# Patient Record
Sex: MALE | Race: WHITE | NOT HISPANIC OR LATINO | Employment: FULL TIME | URBAN - METROPOLITAN AREA
[De-identification: names, ages, dates, MRNs, and addresses within clinical notes are randomized per-mention and may not be internally consistent; named-entity substitution may affect disease eponyms.]

---

## 2019-05-13 ENCOUNTER — HOSPITAL ENCOUNTER (OUTPATIENT)
Dept: WOUND CARE | Facility: HOSPITAL | Age: 39
Discharge: HOME | End: 2019-05-13
Attending: SURGERY | Admitting: SURGERY

## 2019-05-16 ENCOUNTER — CONVERSION ENCOUNTER (OUTPATIENT)
Dept: SURGERY | Facility: CLINIC | Age: 39
End: 2019-05-16

## 2019-05-28 ENCOUNTER — OFFICE (AMBULATORY)
Dept: URBAN - METROPOLITAN AREA CLINIC 64 | Facility: CLINIC | Age: 39
End: 2019-05-28

## 2019-05-28 VITALS
SYSTOLIC BLOOD PRESSURE: 133 MMHG | HEART RATE: 96 BPM | HEIGHT: 67 IN | DIASTOLIC BLOOD PRESSURE: 97 MMHG | WEIGHT: 203 LBS

## 2019-05-28 DIAGNOSIS — K62.5 HEMORRHAGE OF ANUS AND RECTUM: ICD-10-CM

## 2019-05-28 DIAGNOSIS — K60.3 ANAL FISTULA: ICD-10-CM

## 2019-05-28 PROCEDURE — 99243 OFF/OP CNSLTJ NEW/EST LOW 30: CPT | Performed by: NURSE PRACTITIONER

## 2019-06-04 VITALS
BODY MASS INDEX: 32.62 KG/M2 | DIASTOLIC BLOOD PRESSURE: 90 MMHG | HEART RATE: 74 BPM | HEIGHT: 67 IN | SYSTOLIC BLOOD PRESSURE: 132 MMHG | WEIGHT: 207.8 LBS | OXYGEN SATURATION: 98 %

## 2019-06-07 ENCOUNTER — OFFICE (AMBULATORY)
Dept: URBAN - METROPOLITAN AREA PATHOLOGY 4 | Facility: PATHOLOGY | Age: 39
End: 2019-06-07
Payer: COMMERCIAL

## 2019-06-07 ENCOUNTER — ON CAMPUS - OUTPATIENT (AMBULATORY)
Dept: URBAN - METROPOLITAN AREA HOSPITAL 2 | Facility: HOSPITAL | Age: 39
End: 2019-06-07

## 2019-06-07 VITALS
HEIGHT: 67 IN | DIASTOLIC BLOOD PRESSURE: 93 MMHG | SYSTOLIC BLOOD PRESSURE: 135 MMHG | DIASTOLIC BLOOD PRESSURE: 98 MMHG | DIASTOLIC BLOOD PRESSURE: 39 MMHG | HEART RATE: 104 BPM | SYSTOLIC BLOOD PRESSURE: 167 MMHG | OXYGEN SATURATION: 92 % | DIASTOLIC BLOOD PRESSURE: 105 MMHG | HEART RATE: 85 BPM | RESPIRATION RATE: 15 BRPM | RESPIRATION RATE: 18 BRPM | SYSTOLIC BLOOD PRESSURE: 137 MMHG | OXYGEN SATURATION: 96 % | SYSTOLIC BLOOD PRESSURE: 140 MMHG | SYSTOLIC BLOOD PRESSURE: 107 MMHG | DIASTOLIC BLOOD PRESSURE: 79 MMHG | WEIGHT: 200 LBS | HEART RATE: 115 BPM | HEART RATE: 113 BPM | DIASTOLIC BLOOD PRESSURE: 87 MMHG | DIASTOLIC BLOOD PRESSURE: 73 MMHG | SYSTOLIC BLOOD PRESSURE: 122 MMHG | OXYGEN SATURATION: 98 % | SYSTOLIC BLOOD PRESSURE: 114 MMHG | HEART RATE: 66 BPM | OXYGEN SATURATION: 97 % | OXYGEN SATURATION: 94 % | OXYGEN SATURATION: 95 % | RESPIRATION RATE: 16 BRPM | TEMPERATURE: 97.8 F | HEART RATE: 91 BPM

## 2019-06-07 DIAGNOSIS — D12.3 BENIGN NEOPLASM OF TRANSVERSE COLON: ICD-10-CM

## 2019-06-07 DIAGNOSIS — K62.5 HEMORRHAGE OF ANUS AND RECTUM: ICD-10-CM

## 2019-06-07 DIAGNOSIS — K62.1 RECTAL POLYP: ICD-10-CM

## 2019-06-07 DIAGNOSIS — K62.6 ULCER OF ANUS AND RECTUM: ICD-10-CM

## 2019-06-07 LAB
GI HISTOLOGY: A. UNSPECIFIED: (no result)
GI HISTOLOGY: B. UNSPECIFIED: (no result)
GI HISTOLOGY: PDF REPORT: (no result)

## 2019-06-07 PROCEDURE — 45380 COLONOSCOPY AND BIOPSY: CPT | Performed by: INTERNAL MEDICINE

## 2019-06-07 PROCEDURE — 88305 TISSUE EXAM BY PATHOLOGIST: CPT | Performed by: INTERNAL MEDICINE

## 2019-06-17 ENCOUNTER — APPOINTMENT (OUTPATIENT)
Dept: PREADMISSION TESTING | Facility: HOSPITAL | Age: 39
End: 2019-06-17

## 2019-06-17 LAB
ANION GAP SERPL CALCULATED.3IONS-SCNC: 11 MMOL/L (ref 10–20)
BASOPHILS # BLD AUTO: 0.1 10*3/MM3 (ref 0–0.2)
BASOPHILS NFR BLD AUTO: 1.1 % (ref 0–1.5)
BUN BLD-MCNC: 12 MG/DL (ref 8–20)
BUN/CREAT SERPL: 10 (ref 6.2–20.3)
CALCIUM SPEC-SCNC: 9.3 MG/DL (ref 8.9–10.3)
CHLORIDE SERPL-SCNC: 104 MMOL/L (ref 101–111)
CO2 SERPL-SCNC: 25 MMOL/L (ref 22–32)
CREAT BLD-MCNC: 1.2 MG/DL (ref 0.7–1.2)
DEPRECATED RDW RBC AUTO: 39.4 FL (ref 37–54)
EOSINOPHIL # BLD AUTO: 0.1 10*3/MM3 (ref 0–0.4)
EOSINOPHIL NFR BLD AUTO: 1.6 % (ref 0.3–6.2)
ERYTHROCYTE [DISTWIDTH] IN BLOOD BY AUTOMATED COUNT: 12.7 % (ref 12.3–15.4)
GFR SERPL CREATININE-BSD FRML MDRD: 68 ML/MIN/1.73
GLUCOSE BLD-MCNC: 97 MG/DL (ref 65–99)
HCT VFR BLD AUTO: 46.6 % (ref 37.5–51)
HGB BLD-MCNC: 16 G/DL (ref 13–17.7)
LYMPHOCYTES # BLD AUTO: 1.5 10*3/MM3 (ref 0.7–3.1)
LYMPHOCYTES NFR BLD AUTO: 26.9 % (ref 19.6–45.3)
MCH RBC QN AUTO: 30.1 PG (ref 26.6–33)
MCHC RBC AUTO-ENTMCNC: 34.4 G/DL (ref 31.5–35.7)
MCV RBC AUTO: 87.5 FL (ref 79–97)
MONOCYTES # BLD AUTO: 0.4 10*3/MM3 (ref 0.1–0.9)
MONOCYTES NFR BLD AUTO: 6.7 % (ref 5–12)
NEUTROPHILS # BLD AUTO: 3.4 10*3/MM3 (ref 1.7–7)
NEUTROPHILS NFR BLD AUTO: 63.7 % (ref 42.7–76)
NRBC BLD AUTO-RTO: 0.4 /100 WBC (ref 0–0.2)
PLATELET # BLD AUTO: 184 10*3/MM3 (ref 140–450)
PMV BLD AUTO: 10 FL (ref 6–12)
POTASSIUM BLD-SCNC: 4.1 MMOL/L (ref 3.6–5.1)
RBC # BLD AUTO: 5.32 10*6/MM3 (ref 4.14–5.8)
SODIUM BLD-SCNC: 140 MMOL/L (ref 136–144)
WBC NRBC COR # BLD: 5.4 10*3/MM3 (ref 3.4–10.8)

## 2019-06-17 PROCEDURE — 80048 BASIC METABOLIC PNL TOTAL CA: CPT | Performed by: SURGERY

## 2019-06-17 PROCEDURE — 85025 COMPLETE CBC W/AUTO DIFF WBC: CPT | Performed by: SURGERY

## 2019-06-19 ENCOUNTER — ANESTHESIA EVENT (OUTPATIENT)
Dept: PERIOP | Facility: HOSPITAL | Age: 39
End: 2019-06-19

## 2019-06-19 ENCOUNTER — ANESTHESIA (OUTPATIENT)
Dept: PERIOP | Facility: HOSPITAL | Age: 39
End: 2019-06-19

## 2019-06-19 ENCOUNTER — HOSPITAL ENCOUNTER (OUTPATIENT)
Facility: HOSPITAL | Age: 39
Setting detail: HOSPITAL OUTPATIENT SURGERY
Discharge: HOME | End: 2019-06-19
Attending: SURGERY | Admitting: SURGERY

## 2019-06-19 VITALS
BODY MASS INDEX: 32.74 KG/M2 | WEIGHT: 203.71 LBS | DIASTOLIC BLOOD PRESSURE: 72 MMHG | HEIGHT: 66 IN | TEMPERATURE: 97.3 F | RESPIRATION RATE: 20 BRPM | HEART RATE: 63 BPM | SYSTOLIC BLOOD PRESSURE: 105 MMHG | OXYGEN SATURATION: 97 %

## 2019-06-19 PROBLEM — K60.3 TRANSSPHINCTERIC ANAL FISTULA: Status: ACTIVE | Noted: 2019-06-19

## 2019-06-19 PROCEDURE — 25010000002 MIDAZOLAM PER 1 MG: Performed by: ANESTHESIOLOGIST ASSISTANT

## 2019-06-19 PROCEDURE — 99024 POSTOP FOLLOW-UP VISIT: CPT | Performed by: SURGERY

## 2019-06-19 PROCEDURE — 25010000002 KETOROLAC TROMETHAMINE PER 15 MG: Performed by: ANESTHESIOLOGIST ASSISTANT

## 2019-06-19 PROCEDURE — 25010000002 PROPOFOL 200 MG/20ML EMULSION: Performed by: ANESTHESIOLOGIST ASSISTANT

## 2019-06-19 PROCEDURE — 46020 PLACEMENT OF SETON: CPT | Performed by: SURGERY

## 2019-06-19 PROCEDURE — 25010000002 FENTANYL CITRATE (PF) 250 MCG/5ML SOLUTION: Performed by: ANESTHESIOLOGIST ASSISTANT

## 2019-06-19 PROCEDURE — 25010000002 ONDANSETRON PER 1 MG: Performed by: ANESTHESIOLOGIST ASSISTANT

## 2019-06-19 PROCEDURE — 25010000002 DEXAMETHASONE PER 1 MG: Performed by: ANESTHESIOLOGIST ASSISTANT

## 2019-06-19 RX ORDER — LIDOCAINE HYDROCHLORIDE 10 MG/ML
INJECTION, SOLUTION EPIDURAL; INFILTRATION; INTRACAUDAL; PERINEURAL AS NEEDED
Status: DISCONTINUED | OUTPATIENT
Start: 2019-06-19 | End: 2019-06-19 | Stop reason: SURG

## 2019-06-19 RX ORDER — LORAZEPAM 2 MG/ML
1 INJECTION INTRAMUSCULAR
Status: DISCONTINUED | OUTPATIENT
Start: 2019-06-19 | End: 2019-06-19 | Stop reason: HOSPADM

## 2019-06-19 RX ORDER — PROMETHAZINE HYDROCHLORIDE 25 MG/1
25 SUPPOSITORY RECTAL ONCE AS NEEDED
Status: DISCONTINUED | OUTPATIENT
Start: 2019-06-19 | End: 2019-06-19 | Stop reason: HOSPADM

## 2019-06-19 RX ORDER — ONDANSETRON 2 MG/ML
INJECTION INTRAMUSCULAR; INTRAVENOUS AS NEEDED
Status: DISCONTINUED | OUTPATIENT
Start: 2019-06-19 | End: 2019-06-19 | Stop reason: SURG

## 2019-06-19 RX ORDER — PROMETHAZINE HYDROCHLORIDE 25 MG/1
25 TABLET ORAL ONCE AS NEEDED
Status: DISCONTINUED | OUTPATIENT
Start: 2019-06-19 | End: 2019-06-19 | Stop reason: HOSPADM

## 2019-06-19 RX ORDER — SODIUM CHLORIDE, SODIUM LACTATE, POTASSIUM CHLORIDE, CALCIUM CHLORIDE 600; 310; 30; 20 MG/100ML; MG/100ML; MG/100ML; MG/100ML
30 INJECTION, SOLUTION INTRAVENOUS CONTINUOUS
Status: DISCONTINUED | OUTPATIENT
Start: 2019-06-19 | End: 2019-06-19 | Stop reason: HOSPADM

## 2019-06-19 RX ORDER — ACETAMINOPHEN 650 MG/1
650 SUPPOSITORY RECTAL ONCE AS NEEDED
Status: DISCONTINUED | OUTPATIENT
Start: 2019-06-19 | End: 2019-06-19 | Stop reason: HOSPADM

## 2019-06-19 RX ORDER — MIDAZOLAM HYDROCHLORIDE 1 MG/ML
INJECTION INTRAMUSCULAR; INTRAVENOUS AS NEEDED
Status: DISCONTINUED | OUTPATIENT
Start: 2019-06-19 | End: 2019-06-19 | Stop reason: SURG

## 2019-06-19 RX ORDER — ONDANSETRON 2 MG/ML
4 INJECTION INTRAMUSCULAR; INTRAVENOUS ONCE AS NEEDED
Status: DISCONTINUED | OUTPATIENT
Start: 2019-06-19 | End: 2019-06-19 | Stop reason: HOSPADM

## 2019-06-19 RX ORDER — OXYCODONE HYDROCHLORIDE 5 MG/1
10 TABLET ORAL EVERY 4 HOURS PRN
Status: DISCONTINUED | OUTPATIENT
Start: 2019-06-19 | End: 2019-06-19 | Stop reason: HOSPADM

## 2019-06-19 RX ORDER — FLUMAZENIL 0.1 MG/ML
0.2 INJECTION INTRAVENOUS AS NEEDED
Status: DISCONTINUED | OUTPATIENT
Start: 2019-06-19 | End: 2019-06-19 | Stop reason: HOSPADM

## 2019-06-19 RX ORDER — PROPOFOL 10 MG/ML
INJECTION, EMULSION INTRAVENOUS AS NEEDED
Status: DISCONTINUED | OUTPATIENT
Start: 2019-06-19 | End: 2019-06-19 | Stop reason: SURG

## 2019-06-19 RX ORDER — NALOXONE HCL 0.4 MG/ML
0.4 VIAL (ML) INJECTION AS NEEDED
Status: DISCONTINUED | OUTPATIENT
Start: 2019-06-19 | End: 2019-06-19 | Stop reason: HOSPADM

## 2019-06-19 RX ORDER — OXYCODONE HYDROCHLORIDE 5 MG/1
5 TABLET ORAL ONCE AS NEEDED
Status: DISCONTINUED | OUTPATIENT
Start: 2019-06-19 | End: 2019-06-19 | Stop reason: HOSPADM

## 2019-06-19 RX ORDER — SODIUM CHLORIDE 0.9 % (FLUSH) 0.9 %
3 SYRINGE (ML) INJECTION EVERY 12 HOURS SCHEDULED
Status: DISCONTINUED | OUTPATIENT
Start: 2019-06-19 | End: 2019-06-19 | Stop reason: HOSPADM

## 2019-06-19 RX ORDER — FENTANYL CITRATE 50 UG/ML
50 INJECTION, SOLUTION INTRAMUSCULAR; INTRAVENOUS
Status: DISCONTINUED | OUTPATIENT
Start: 2019-06-19 | End: 2019-06-19 | Stop reason: HOSPADM

## 2019-06-19 RX ORDER — SODIUM CHLORIDE 0.9 % (FLUSH) 0.9 %
3 SYRINGE (ML) INJECTION EVERY 12 HOURS SCHEDULED
Status: DISCONTINUED | OUTPATIENT
Start: 2019-06-19 | End: 2019-06-19

## 2019-06-19 RX ORDER — ACETAMINOPHEN 325 MG/1
650 TABLET ORAL ONCE AS NEEDED
Status: DISCONTINUED | OUTPATIENT
Start: 2019-06-19 | End: 2019-06-19 | Stop reason: HOSPADM

## 2019-06-19 RX ORDER — DEXAMETHASONE SODIUM PHOSPHATE 4 MG/ML
INJECTION, SOLUTION INTRA-ARTICULAR; INTRALESIONAL; INTRAMUSCULAR; INTRAVENOUS; SOFT TISSUE AS NEEDED
Status: DISCONTINUED | OUTPATIENT
Start: 2019-06-19 | End: 2019-06-19 | Stop reason: SURG

## 2019-06-19 RX ORDER — FENTANYL CITRATE 50 UG/ML
INJECTION, SOLUTION INTRAMUSCULAR; INTRAVENOUS AS NEEDED
Status: DISCONTINUED | OUTPATIENT
Start: 2019-06-19 | End: 2019-06-19 | Stop reason: SURG

## 2019-06-19 RX ORDER — BUPIVACAINE HYDROCHLORIDE AND EPINEPHRINE 2.5; 5 MG/ML; UG/ML
INJECTION, SOLUTION EPIDURAL; INFILTRATION; INTRACAUDAL; PERINEURAL AS NEEDED
Status: DISCONTINUED | OUTPATIENT
Start: 2019-06-19 | End: 2019-06-19 | Stop reason: HOSPADM

## 2019-06-19 RX ORDER — SODIUM CHLORIDE 0.9 % (FLUSH) 0.9 %
3-10 SYRINGE (ML) INJECTION AS NEEDED
Status: DISCONTINUED | OUTPATIENT
Start: 2019-06-19 | End: 2019-06-19

## 2019-06-19 RX ORDER — SODIUM CHLORIDE 0.9 % (FLUSH) 0.9 %
3-10 SYRINGE (ML) INJECTION AS NEEDED
Status: DISCONTINUED | OUTPATIENT
Start: 2019-06-19 | End: 2019-06-19 | Stop reason: HOSPADM

## 2019-06-19 RX ORDER — KETOROLAC TROMETHAMINE 30 MG/ML
INJECTION, SOLUTION INTRAMUSCULAR; INTRAVENOUS AS NEEDED
Status: DISCONTINUED | OUTPATIENT
Start: 2019-06-19 | End: 2019-06-19 | Stop reason: SURG

## 2019-06-19 RX ORDER — HYDROCODONE BITARTRATE AND ACETAMINOPHEN 5; 325 MG/1; MG/1
1 TABLET ORAL EVERY 4 HOURS PRN
Qty: 15 TABLET | Refills: 0 | Status: SHIPPED | OUTPATIENT
Start: 2019-06-19 | End: 2019-09-05

## 2019-06-19 RX ORDER — PROMETHAZINE HYDROCHLORIDE 25 MG/ML
12.5 INJECTION, SOLUTION INTRAMUSCULAR; INTRAVENOUS ONCE AS NEEDED
Status: DISCONTINUED | OUTPATIENT
Start: 2019-06-19 | End: 2019-06-19 | Stop reason: HOSPADM

## 2019-06-19 RX ADMIN — DEXAMETHASONE SODIUM PHOSPHATE 4 MG: 4 INJECTION, SOLUTION INTRAMUSCULAR; INTRAVENOUS at 07:38

## 2019-06-19 RX ADMIN — LIDOCAINE HYDROCHLORIDE 50 MG: 10 INJECTION, SOLUTION EPIDURAL; INFILTRATION; INTRACAUDAL; PERINEURAL at 07:34

## 2019-06-19 RX ADMIN — FENTANYL CITRATE 50 MCG: 50 INJECTION, SOLUTION INTRAMUSCULAR; INTRAVENOUS at 07:34

## 2019-06-19 RX ADMIN — MIDAZOLAM 2 MG: 1 INJECTION INTRAMUSCULAR; INTRAVENOUS at 07:32

## 2019-06-19 RX ADMIN — SODIUM CHLORIDE, SODIUM LACTATE, POTASSIUM CHLORIDE, AND CALCIUM CHLORIDE 30 ML/HR: 600; 310; 30; 20 INJECTION, SOLUTION INTRAVENOUS at 06:52

## 2019-06-19 RX ADMIN — ONDANSETRON 4 MG: 2 INJECTION INTRAMUSCULAR; INTRAVENOUS at 08:01

## 2019-06-19 RX ADMIN — SODIUM CHLORIDE, SODIUM LACTATE, POTASSIUM CHLORIDE, AND CALCIUM CHLORIDE: 600; 310; 30; 20 INJECTION, SOLUTION INTRAVENOUS at 07:32

## 2019-06-19 RX ADMIN — CEFAZOLIN SODIUM 2 G: 1 INJECTION, POWDER, FOR SOLUTION INTRAMUSCULAR; INTRAVENOUS at 07:40

## 2019-06-19 RX ADMIN — KETOROLAC TROMETHAMINE 30 MG: 30 INJECTION, SOLUTION INTRAMUSCULAR at 08:09

## 2019-06-19 RX ADMIN — PROPOFOL 200 MG: 10 INJECTION, EMULSION INTRAVENOUS at 07:34

## 2019-06-19 NOTE — ANESTHESIA POSTPROCEDURE EVALUATION
Patient: Cj Jasso    Procedure Summary     Date:  06/19/19 Room / Location:  [facility] OR 08 / [facility] MAIN OR    Anesthesia Start:  0732 Anesthesia Stop:  0820    Procedure:  RECTAL EXAM UNDER ANESTHESIA, ANAL FISTULA REPAIR WITH CUTTING SETON PLACEMENT (N/A Rectum) Diagnosis:      Surgeon:  Andrei Bhandari MD Provider:  Eric Ko DO    Anesthesia Type:  general ASA Status:  1          Anesthesia Type: general  Last vitals  BP   111/66 (06/19/19 0902)   Temp   97.6 °F (36.4 °C) (06/19/19 0902)   Pulse   77 (06/19/19 0902)   Resp   17 (06/19/19 0902)     SpO2   95 % (06/19/19 0902)     Post Anesthesia Care and Evaluation    Patient location during evaluation: PACU  Patient participation: complete - patient participated  Level of consciousness: awake and alert  Pain score: 4  Pain management: adequate  Airway patency: patent  Anesthetic complications: No anesthetic complications  PONV Status: none  Cardiovascular status: acceptable  Respiratory status: acceptable  Hydration status: acceptable

## 2019-06-19 NOTE — ANESTHESIA PROCEDURE NOTES
Airway  Urgency: elective    Date/Time: 6/19/2019 7:36 AM  End Time:6/19/2019 7:37 AM  Airway not difficult    General Information and Staff    Patient location during procedure: OR  Anesthesiologist: Eric Ko DO  CRNA: Nate Reeder AA    Indications and Patient Condition  Indications for airway management: airway protection    Preoxygenated: yes  Mask difficulty assessment: 1 - vent by mask    Final Airway Details  Final airway type: supraglottic airway      Successful airway: LMA  Size 4

## 2019-06-19 NOTE — ANESTHESIA PREPROCEDURE EVALUATION
Anesthesia Evaluation     Patient summary reviewed and Nursing notes reviewed   NPO Solid Status: > 8 hours  NPO Liquid Status: > 8 hours           Airway   Mallampati: I  TM distance: >3 FB  Neck ROM: full  No difficulty expected  Dental - normal exam     Pulmonary - negative pulmonary ROS and normal exam   Cardiovascular - negative cardio ROS and normal exam        Neuro/Psych- negative ROS  GI/Hepatic/Renal/Endo - negative ROS     Musculoskeletal (-) negative ROS    Abdominal  - normal exam    Bowel sounds: normal.   Substance History - negative use     OB/GYN negative ob/gyn ROS         Other                      Anesthesia Plan    ASA 1     general     intravenous induction   Anesthetic plan, all risks, benefits, and alternatives have been provided, discussed and informed consent has been obtained with: patient.    Plan discussed with attending.

## 2019-06-19 NOTE — ANESTHESIA POSTPROCEDURE EVALUATION
Patient: Cj Jasso    Procedure Summary     Date:  06/19/19 Room / Location:  [facility] OR 08 / [facility] MAIN OR    Anesthesia Start:  0732 Anesthesia Stop:  0820    Procedures:       RECTAL EXAM UNDER ANESTHESIA (N/A Rectum)      ANAL FISTULA REPAIR (N/A Perineum) Diagnosis:      Surgeon:  Andrei Bhandari MD Provider:  Eric Ko DO    Anesthesia Type:  general ASA Status:  1          Anesthesia Type: general  Last vitals  BP   112/70 (06/19/19 0818)   Temp   97 °F (36.1 °C) (06/19/19 0818)   Pulse   90 (06/19/19 0818)   Resp   13 (06/19/19 0818)     SpO2   98 % (06/19/19 0818)     Post Anesthesia Care and Evaluation    Patient location during evaluation: PACU  Patient participation: complete - patient participated  Level of consciousness: awake  Pain management: adequate  Airway patency: patent  Anesthetic complications: No anesthetic complications  PONV Status: none  Cardiovascular status: acceptable  Respiratory status: acceptable  Hydration status: acceptable    Comments: Patient seen and examined postoperatively; vital signs stable; SpO2 greater than or equal to 90%; cardiopulmonary status stable; nausea/vomiting adequately controlled; pain adequately controlled; no apparent anesthesia complications; patient discharged from anesthesia care when discharge criteria were met

## 2019-06-19 NOTE — OP NOTE
Operative Report:    Patient Name:  Cj Jasso  YOB: 1980    Date of Surgery:  6/19/2019     Indications: 39-year-old woman referred to the office for evaluation of a anal fistula.  We discussed the diagnosis and treatment options.  After discussion he was proceed with rectal exam under anesthesia and treatment of anal fistula.    Pre-op Diagnosis:   Anal fistula       Post-Op Diagnosis Codes:  Trans-enteric anal fistula    Procedure/CPT® Codes:      Procedure(s):  RECTAL EXAM UNDER ANESTHESIA  Placement of anal fistula seton    Staff:  Surgeon(s):  Andrei Bhandari MD         Anesthesia: General    Estimated Blood Loss: 5 mL    Implants:    Nothing was implanted during the procedure    Specimen:          None        Findings: Left anterior lateral anal fistula that partially traverse the external anal sphincter.  Three column internal and external hemorrhoids    Complications: none    Description of Procedure: Patient was taken the operating room table and without any position under general anesthesia.  His perineum was prepped and draped normal sterile fashion.  Timeout performed verifying the patient procedure site operation.    The operation by performing a inspection of the anal canal.  He had a left anterior lateral anal fistula without signs of abscess.  He was also found to have 3 column internal and external hemorrhoids.  There is no evidence of thrombosis or bleeding.  I placed the bullet retractor and did not see any evidence of mass.  I used a lacrimal duct probe to define the fistula tract which went from the anterior aspect of the anus to the left anterior lateral perirectal skin.  This felt fairly superficial and based on palpation it seems like this was above the external  so I planned on performing a fistulotomy.  I injected the perineal skin is obtains tissues with local anesthetic.  I then used the Bovie left cautery to incise the anoderm.  However after partially  opening the fistula tract it was apparent that it at least partially traverse the external shincter.  Therefore I change my plan and decided to place a Silastic band through tract to act as a cutting seton to minimize the risk of complications like incontinence.  I secured the seton and then decided to use a couple of 3-0 Vicryl sutures to partially close the anoderm to help minimize postoperative pain.       Andrei Bhandari MD     Date: 6/19/2019  Time: 8:33 AM

## 2019-06-19 NOTE — H&P
Patient Care Team:  Gabby Amse MD as PCP - General (Family Medicine)  Gabby Ames MD as PCP - Family Medicine    Chief complaint perianal swelling and drainage    Subjective   Seen in the office 33 days ago H&P greater than 30 days  39-year-old man who was referred to me from wound care for evaluation of a perianal fistula.  He says that he had an abscess back in January that required drainage.  Since that time he has had several cycles of intermittent swelling in the perianal soft tissues followed by drainage of bloody fluid.  This does cause discomfort.  He does have a family history of colon cancer and a first cousin in his 30s and underwent colonoscopy recently.  He is here today to undergo the planned and discussed rectal exam under anesthesia with treatment of his perianal fistula    History  Past Medical History:   Diagnosis Date   • Kidney stones      Past Surgical History:   Procedure Laterality Date   • COLONOSCOPY       History reviewed. No pertinent family history.  Social History     Tobacco Use   • Smoking status: Never Smoker   Substance Use Topics   • Alcohol use: No     Frequency: Never   • Drug use: No     No medications prior to admission.     Allergies:  Patient has no known allergies.    Review of Systems   Constitutional: Negative for chills and fever.   HENT: Negative for sore throat and trouble swallowing.    Eyes: Negative for blurred vision and double vision.   Respiratory: Negative for cough and shortness of breath.    Cardiovascular: Negative for chest pain and leg swelling.   Gastrointestinal: Positive for rectal pain. Negative for abdominal distention, abdominal pain, blood in stool, constipation, diarrhea, nausea and vomiting.   Genitourinary: Negative for dysuria and hematuria.   Skin: Negative for rash and bruise.   Neurological: Negative for dizziness and confusion.   Psychiatric/Behavioral: Negative for agitation and depressed mood.        Objective     Vital  Signs  Temp:  [97.6 °F (36.4 °C)] 97.6 °F (36.4 °C)  Heart Rate:  [67] 67  Resp:  [17] 17  BP: (126)/(90) 126/90    Physical Exam   Constitutional: He is oriented to person, place, and time. He appears well-developed and well-nourished.   HENT:   Head: Normocephalic and atraumatic.   Eyes: Conjunctivae and EOM are normal.   Neck: No tracheal deviation present.   Cardiovascular: Normal rate and regular rhythm.   Pulmonary/Chest: Effort normal. No stridor.   Abdominal: Soft. He exhibits no distension. There is no tenderness. There is no rebound and no guarding.   Musculoskeletal: He exhibits no edema or tenderness.   Lymphadenopathy:     He has no cervical adenopathy.   Neurological: He is oriented to person, place, and time. No cranial nerve deficit.   Skin: Skin is warm and dry.   Psychiatric: He has a normal mood and affect. His behavior is normal.       Results Review:  Lab Results (last 24 hours)     ** No results found for the last 24 hours. **        Imaging Results (last 24 hours)     ** No results found for the last 24 hours. **          I reviewed the patient's other test results and agree with the interpretation  I reviewed the patient's new imaging results and agree with the interpretation.    Assessment/Plan     Active Problems:    * No active hospital problems. *      Perianal abscess status post incision drainage with a development of what is likely a perianal fistula.  We discussed the risks and benefits of proceeding to the operating room with rectal exam under anesthesia and treatment of his fistula.  We discussed the treatment options including fistulotomy versus seton placement and coming back at a later date for definitive management of the fistula.  He understands and wishes to proceed today.    On his colonoscopy he was found to have a high risk polyp I believe this was a sessile serrated adenoma.  We will have to discuss this with gastroenterology about whether repeat colonoscopy with  additional biopsies and/or tattooing is planned.  If not we will talk more about proceed to the operating room for colectomy for this high risk polyp.     I discussed the patients findings and my recommendations with patient.     Andrei Bhandari MD  06/19/19  7:09 AM

## 2019-07-05 ENCOUNTER — OFFICE VISIT (OUTPATIENT)
Dept: SURGERY | Facility: CLINIC | Age: 39
End: 2019-07-05

## 2019-07-05 VITALS
OXYGEN SATURATION: 99 % | HEIGHT: 66 IN | HEART RATE: 59 BPM | WEIGHT: 204.6 LBS | BODY MASS INDEX: 32.88 KG/M2 | TEMPERATURE: 96.9 F | DIASTOLIC BLOOD PRESSURE: 96 MMHG | SYSTOLIC BLOOD PRESSURE: 135 MMHG

## 2019-07-05 DIAGNOSIS — K60.3 TRANSSPHINCTERIC ANAL FISTULA: Primary | ICD-10-CM

## 2019-07-05 PROBLEM — R03.0 ELEVATED BLOOD PRESSURE READING WITHOUT DIAGNOSIS OF HYPERTENSION: Status: ACTIVE | Noted: 2019-01-19

## 2019-07-05 PROCEDURE — 99024 POSTOP FOLLOW-UP VISIT: CPT | Performed by: SURGERY

## 2019-07-05 NOTE — PROGRESS NOTES
"Subjective   Cj Jasso is a 39 y.o. male.   About 2 weeks out from rectal exam under anesthesia and placement of an anal fistula seton.  He says he has been doing pretty well after surgery he has had minimal pain.  He has developed some pink patches over his groins bilaterally he thinks is likely due to perform sitz baths in the tub and times a day.  He has been using topical antifungal cream which has helped somewhat.  He denies any incontinence or significant anal pain.    Objective   /96 (BP Location: Left arm, Patient Position: Sitting, Cuff Size: Adult)   Pulse 59   Temp 96.9 °F (36.1 °C) (Oral)   Ht 167.6 cm (66\")   Wt 92.8 kg (204 lb 9.6 oz)   SpO2 99%   BMI 33.02 kg/m²   Physical Exam   Constitutional: He appears well-developed and well-nourished.   HENT:   Head: Normocephalic and atraumatic.   Genitourinary:   Genitourinary Comments: On rectal inspection does have a prominent right-sided hemorrhoid in the left side there is no significant erythema or induration.  The seton is in place.       Assessment/Plan   Cj was seen today for post-op.    Diagnoses and all orders for this visit:    Transsphincteric anal fistula    I have adjusted the seton and tied that down.  Discussed to moderate discomfort but he tolerated it well.   We will have him stay out of the tub unless significant soilage.  Follow up in 2 weeks to tighten seton.       Andrei Bhandari MD  7/5/2019  9:24 AM    "

## 2019-07-18 ENCOUNTER — OFFICE VISIT (OUTPATIENT)
Dept: SURGERY | Facility: CLINIC | Age: 39
End: 2019-07-18

## 2019-07-18 VITALS
BODY MASS INDEX: 33.07 KG/M2 | WEIGHT: 205.8 LBS | HEIGHT: 66 IN | HEART RATE: 77 BPM | DIASTOLIC BLOOD PRESSURE: 90 MMHG | SYSTOLIC BLOOD PRESSURE: 126 MMHG | OXYGEN SATURATION: 97 % | TEMPERATURE: 97.9 F

## 2019-07-18 DIAGNOSIS — K60.3 TRANSSPHINCTERIC ANAL FISTULA: Primary | ICD-10-CM

## 2019-07-18 PROCEDURE — 99024 POSTOP FOLLOW-UP VISIT: CPT | Performed by: SURGERY

## 2019-07-18 NOTE — PROGRESS NOTES
"Subjective   Cj Jasso is a 39 y.o. male.   Status post rectal exam under anesthesia and seton placement and a trans-enteric anal fistula.  He is her today for evaluation.  No new issues since he was last seen in the office.  The skin hyperemia he was having has resolved since he is now not doing as many tub soaks.  Minimal perianal pain.  He says his bowel function is actually improving quite a bit with the addition of fiber still says he does not drink water.    Objective   /90 (BP Location: Left arm, Patient Position: Sitting, Cuff Size: Adult)   Pulse 77   Temp 97.9 °F (36.6 °C) (Oral)   Ht 167.6 cm (66\")   Wt 93.4 kg (205 lb 12.8 oz)   SpO2 97%   BMI 33.22 kg/m²   Physical Exam   HENT:   Head: Normocephalic and atraumatic.   Eyes: Conjunctivae are normal. Pupils are equal, round, and reactive to light.   Pulmonary/Chest: Effort normal.   Genitourinary:   Genitourinary Comments: On rectal inspection the seton is present without surrounding erythema or induration.   Neurological: He is alert. No cranial nerve deficit.       Assessment/Plan   Cj was seen today for follow-up.    Diagnoses and all orders for this visit:    Transsphincteric anal fistula    I tightened the seton once again without much discomfort.   We will schedule with follow-up for evaluation.  Hopefully the next month or so this will gradually migrate out and fall out.  We talked about alternatives if this does not happen next #2 include going to the operating room for fistulotomy or removing the seton is seen if it would heal up that way    Andrei Bhandari MD  7/18/2019  2:28 PM    "

## 2019-08-01 ENCOUNTER — OFFICE VISIT (OUTPATIENT)
Dept: SURGERY | Facility: CLINIC | Age: 39
End: 2019-08-01

## 2019-08-01 VITALS
OXYGEN SATURATION: 97 % | HEART RATE: 82 BPM | SYSTOLIC BLOOD PRESSURE: 133 MMHG | BODY MASS INDEX: 33.3 KG/M2 | DIASTOLIC BLOOD PRESSURE: 93 MMHG | HEIGHT: 66 IN | WEIGHT: 207.2 LBS | TEMPERATURE: 97.7 F

## 2019-08-01 DIAGNOSIS — K60.3 TRANSSPHINCTERIC ANAL FISTULA: Primary | ICD-10-CM

## 2019-08-01 PROCEDURE — 99024 POSTOP FOLLOW-UP VISIT: CPT | Performed by: SURGERY

## 2019-08-01 NOTE — PROGRESS NOTES
"Subjective   Cj Jasso is a 39 y.o. male.   Patient is about 6 weeks out from seton placement through a trans-enteric anal fistula.  He denies any new issues.  He had a normal bowel function.    Objective   /93 (BP Location: Left arm, Patient Position: Sitting, Cuff Size: Adult)   Pulse 82   Temp 97.7 °F (36.5 °C) (Oral)   Ht 167.6 cm (66\")   Wt 94 kg (207 lb 3.2 oz)   SpO2 97%   BMI 33.44 kg/m²   Physical Exam   Constitutional: He appears well-developed and well-nourished.   Eyes: Conjunctivae and EOM are normal.   Abdominal: Soft.   Genitourinary:   Genitourinary Comments: On rectal inspection the seton has nearly eroded through the sphincter and has a common opening at the external skin for both ends of the loop.       Assessment/Plan   Cj was seen today for follow-up.    Diagnoses and all orders for this visit:    Transsphincteric anal fistula    6 weeks out from seton placement for anal fistula.  I counseled him about continuing to take down the seton but has not made much progress in the last visit.  We talked about proceed to the operating room for a fistulotomy but because now there is a common opening for both ends of the seton I think that if I can the seton will likely close over.  If not I counseled him that return to the operating room for additional physical therapy may be indicated.  At this point the symptoms that he was describing prior to the completion have resolved.  He can have been removed.  We will have him follow-up in 4 to 6 weeks for a recheck.    Andrei Bhandari MD  8/1/2019  4:18 PM    "

## 2019-08-31 ENCOUNTER — HOSPITAL ENCOUNTER (EMERGENCY)
Facility: HOSPITAL | Age: 39
Discharge: HOME | End: 2019-08-31
Admitting: EMERGENCY MEDICINE

## 2019-08-31 VITALS
DIASTOLIC BLOOD PRESSURE: 85 MMHG | HEIGHT: 67 IN | TEMPERATURE: 97.5 F | SYSTOLIC BLOOD PRESSURE: 127 MMHG | HEART RATE: 66 BPM | RESPIRATION RATE: 15 BRPM | OXYGEN SATURATION: 98 % | BODY MASS INDEX: 33.36 KG/M2 | WEIGHT: 212.52 LBS

## 2019-08-31 DIAGNOSIS — S86.912A MUSCLE STRAIN OF LEFT LOWER LEG, INITIAL ENCOUNTER: Primary | ICD-10-CM

## 2019-08-31 PROCEDURE — 99283 EMERGENCY DEPT VISIT LOW MDM: CPT

## 2019-08-31 RX ORDER — LIDOCAINE 50 MG/G
1 PATCH TOPICAL ONCE
Status: DISCONTINUED | OUTPATIENT
Start: 2019-08-31 | End: 2019-08-31 | Stop reason: HOSPADM

## 2019-08-31 RX ADMIN — LIDOCAINE 1 PATCH: 50 PATCH CUTANEOUS at 19:52

## 2019-09-05 ENCOUNTER — OFFICE VISIT (OUTPATIENT)
Dept: SURGERY | Facility: CLINIC | Age: 39
End: 2019-09-05

## 2019-09-05 VITALS
DIASTOLIC BLOOD PRESSURE: 81 MMHG | HEIGHT: 66 IN | WEIGHT: 209.2 LBS | OXYGEN SATURATION: 98 % | TEMPERATURE: 98 F | HEART RATE: 74 BPM | SYSTOLIC BLOOD PRESSURE: 142 MMHG | BODY MASS INDEX: 33.62 KG/M2

## 2019-09-05 DIAGNOSIS — K60.3 TRANSSPHINCTERIC ANAL FISTULA: Primary | ICD-10-CM

## 2019-09-05 PROCEDURE — 99212 OFFICE O/P EST SF 10 MIN: CPT | Performed by: SURGERY

## 2019-09-05 NOTE — PROGRESS NOTES
"Subjective   Cj Jasso is a 39 y.o. male.   Status post rectal exam under anesthesia and seton placement for a trans-enteric anal fistula.  His seton was nearly eroded through at his last office appointment and had actually had the mucosa closed over the seton to where it had a common exit in the external perianal skin.  This was removed and he is here today for follow-up to evaluate for any issues with his bowel function.  He says that he has had no issues with pain or drainage in this location.  At times he had prior to visiting me have resolved.      Objective   /81   Pulse 74   Temp 98 °F (36.7 °C) (Oral)   Ht 167.6 cm (66\")   Wt 94.9 kg (209 lb 3.2 oz)   SpO2 98%   BMI 33.77 kg/m²   Physical Exam   Constitutional: He appears well-developed and well-nourished.   HENT:   Head: Normocephalic and atraumatic.   Genitourinary:   Genitourinary Comments: On rectal inspection there is a healed area in the left anterior lateral aspect of the anus where the fistula tract was.  No evidence of fluctuance or abscess       Assessment/Plan   Cj was seen today for follow-up.    Diagnoses and all orders for this visit:    Transsphincteric anal fistula    Status post treatment of trans-enteric anal fistula with a seton which was then removed.  He can follow-up with me as needed in the future if any issues occur.    Andrei Bhandari MD  9/5/2019  4:06 PM    "

## 2020-10-11 ENCOUNTER — HOSPITAL ENCOUNTER (EMERGENCY)
Facility: HOSPITAL | Age: 40
Discharge: HOME | End: 2020-10-11
Attending: EMERGENCY MEDICINE | Admitting: EMERGENCY MEDICINE

## 2020-10-11 VITALS
HEIGHT: 66 IN | BODY MASS INDEX: 32.77 KG/M2 | TEMPERATURE: 98.4 F | WEIGHT: 203.93 LBS | DIASTOLIC BLOOD PRESSURE: 94 MMHG | SYSTOLIC BLOOD PRESSURE: 138 MMHG | OXYGEN SATURATION: 100 % | HEART RATE: 85 BPM | RESPIRATION RATE: 16 BRPM

## 2020-10-11 DIAGNOSIS — S86.111A GASTROCNEMIUS TEAR, RIGHT, INITIAL ENCOUNTER: Primary | ICD-10-CM

## 2020-10-11 PROCEDURE — 99283 EMERGENCY DEPT VISIT LOW MDM: CPT

## 2020-10-11 NOTE — DISCHARGE INSTRUCTIONS
Follow-up with your primary doctor.  Return to the emergency room for any new or worsening symptoms or if you have any other questions or concerns.  Take Tylenol and ibuprofen as needed.  Ice the right calf.  Use a compression dressing.

## 2020-10-11 NOTE — ED NOTES
"Patient states he was running up a hill, stepped on tree root and fell. Patient states \"it felt like a rubber band popped in my calf.\" Patient c/o right calf pain x 30 minutes     Dorita Cortez RN  10/11/20 3052    "

## 2020-10-11 NOTE — ED PROVIDER NOTES
"Subjective   Chief complaint: Right leg injury    40-year-old male presents with a right leg injury.  Patient states he was running up a hill and felt a pop in his right calf.  He states he has been unable to walk on the right foot since that time.  He states this occurred about 30 minutes ago.  He states at times it feels like his calf is cramping but when he feels that it is soft.  He denies any pain down near the Achilles.      History provided by:  Patient      Review of Systems   Constitutional: Negative for fever.   HENT: Negative for congestion.    Respiratory: Negative for cough and shortness of breath.    Cardiovascular: Negative for chest pain.   Gastrointestinal: Negative for abdominal pain.   Musculoskeletal:        Right calf injury       Past Medical History:   Diagnosis Date   • Kidney stones        No Known Allergies    Past Surgical History:   Procedure Laterality Date   • ANAL FISTULA REPAIR  06/19/2019    DR BHANDARI   • COLONOSCOPY     • RECTAL EXAMINATION UNDER ANESTHESIA N/A 6/19/2019    Procedure: RECTAL EXAM UNDER ANESTHESIA, ANAL FISTULA REPAIR WITH CUTTING SETON PLACEMENT;  Surgeon: Andrei Bhandari MD;  Location: [facility];  Service: General       No family history on file.    Social History     Socioeconomic History   • Marital status:      Spouse name: Not on file   • Number of children: Not on file   • Years of education: Not on file   • Highest education level: Not on file   Tobacco Use   • Smoking status: Never Smoker   • Smokeless tobacco: Never Used   Substance and Sexual Activity   • Alcohol use: No     Frequency: Never   • Drug use: No   • Sexual activity: Defer       /96   Pulse 85   Temp 97.5 °F (36.4 °C)   Resp 20   Ht 167.6 cm (66\")   Wt 92.5 kg (203 lb 14.8 oz)   SpO2 98%   BMI 32.91 kg/m²       Objective   Physical Exam  Vitals signs and nursing note reviewed.   Constitutional:       Appearance: Normal appearance.   HENT:      Head: Normocephalic.      " Mouth/Throat:      Mouth: Mucous membranes are moist.   Cardiovascular:      Rate and Rhythm: Normal rate and regular rhythm.      Heart sounds: Normal heart sounds.   Pulmonary:      Effort: Pulmonary effort is normal. No respiratory distress.      Breath sounds: Normal breath sounds.   Musculoskeletal:      Comments: There is tenderness to palpation to the right mid calf area.  Perez and Matles tests are negative.  There is no tenderness over the achilles.  Neurovascular intact distally.   Skin:     General: Skin is warm and dry.   Neurological:      Mental Status: He is alert and oriented to person, place, and time.         Procedures           ED Course                                           MDM   Exam is consistent with a gastrocnemius tear.  There is no evidence of Achilles rupture.  Patient was advised to ice the calf and use a compression wrap.  He declined crutches.  He is stable for discharge with primary care follow-up.      Final diagnoses:   Gastrocnemius tear, right, initial encounter            Jimmie Hooks MD  10/11/20 [id number]

## 2021-03-02 ENCOUNTER — OFFICE VISIT (OUTPATIENT)
Dept: SURGERY | Facility: CLINIC | Age: 41
End: 2021-03-02

## 2021-03-02 VITALS
RESPIRATION RATE: 16 BRPM | SYSTOLIC BLOOD PRESSURE: 119 MMHG | OXYGEN SATURATION: 97 % | HEIGHT: 66 IN | BODY MASS INDEX: 33.43 KG/M2 | TEMPERATURE: 97.7 F | HEART RATE: 67 BPM | WEIGHT: 208 LBS | DIASTOLIC BLOOD PRESSURE: 87 MMHG

## 2021-03-02 DIAGNOSIS — K60.3 TRANSSPHINCTERIC ANAL FISTULA: Primary | ICD-10-CM

## 2021-03-02 PROCEDURE — 99213 OFFICE O/P EST LOW 20 MIN: CPT | Performed by: SURGERY

## 2021-03-02 NOTE — PROGRESS NOTES
"Subjective   Cj Jasso is a 40 y.o. male.   40-year-old gentleman who I saw a couple years ago with a transsphincteric anal fistula who I ultimately placed a seton tightened it down over the course of several office visits and eventually removed who over the last couple years has done pretty well but noticed an acute change in the area about 3 to 4 weeks ago.  He developed some sharp pain at that spot he noticed a small lump the size of a BB that then gradually increased to the size of a marble and then it drained spontaneously and the pain went away.  He says that about once a week he has had some minor swelling and some minimal drainage.  It does not cause any significant pain with bowel movement.  He denies any fevers or chills.    Objective   /87 (BP Location: Left arm, Patient Position: Sitting, Cuff Size: Adult)   Pulse 67   Temp 97.7 °F (36.5 °C) (Temporal)   Resp 16   Ht 167.6 cm (66\")   Wt 94.3 kg (208 lb)   SpO2 97%   BMI 33.57 kg/m²   Physical Exam  Constitutional:       Appearance: Normal appearance.   Cardiovascular:      Rate and Rhythm: Normal rate.   Genitourinary:     Comments: On rectal inspection of the left anterior lateral location there is a small fistula exit site which is not currently open there is no significant mass in this location there is minimal tenderness to palpation  Skin:     General: Skin is warm and dry.   Neurological:      Mental Status: He is alert.         Assessment/Plan   Diagnoses and all orders for this visit:    1. Transsphincteric anal fistula (Primary)    Recurrent transfer enteric anal fistula.  I have talked to him about some of the treatment options including going back to the operating room for rectal exam under anesthesia for fistula plug or possibly a ligation of the fistula tract.  I talked about some of the general risks of the operation including the risk of recurrence and the possibility for injury to the sphincteric muscles.  I talked him " about the number of people I treat with this disease process is fairly low and that he may have more success seeing somebody who operate more frequently on anal fistulas.  After this discussion he would like to get a second opinion and I will set him up with a referral to colorectal surgery for evaluation.    Andrei Bhandari MD  3/2/2021  8:52 AM EST    This note was created using Dragon Voice Recognition software.

## 2021-05-24 ENCOUNTER — OFFICE VISIT (OUTPATIENT)
Dept: SURGERY | Facility: CLINIC | Age: 41
End: 2021-05-24

## 2021-05-24 VITALS
OXYGEN SATURATION: 100 % | SYSTOLIC BLOOD PRESSURE: 140 MMHG | HEIGHT: 66 IN | HEART RATE: 70 BPM | BODY MASS INDEX: 32.87 KG/M2 | TEMPERATURE: 97.8 F | WEIGHT: 204.5 LBS | DIASTOLIC BLOOD PRESSURE: 88 MMHG

## 2021-05-24 DIAGNOSIS — D12.6 SERRATED ADENOMA OF COLON: ICD-10-CM

## 2021-05-24 DIAGNOSIS — K60.3 ANAL FISTULA: Primary | ICD-10-CM

## 2021-05-24 PROCEDURE — 99243 OFF/OP CNSLTJ NEW/EST LOW 30: CPT | Performed by: COLON & RECTAL SURGERY

## 2021-05-24 NOTE — PROGRESS NOTES
jC Jasso is a 41 y.o. male who is seen as a consult at the request of Andrei Bhandari MD for Consult (HX OF FISTULA).      HPI:    Pt reports having a perirectal abscess and fistula 2 years ago. S/p cutting seton by Dr. Bhandari.  Asymptomatic x1.5 years.  Started with rectal drainage 3 months ago. Followed up with Dr. Bhandari & referred here.  Currently asymptomatic. No RB. No anal drainage    Admits to having a poor diet and episodes of diarrhea.  Takes fiber and has regular BM. Also staying hydrated.     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 THALIA JOHNSON AT [facility]   • INCISION AND DRAINAGE PERIRECTAL ABSCESS N/A 01/30/2019    DONE AT [facility] ER   • RECTAL EXAMINATION UNDER ANESTHESIA N/A 6/19/2019    Procedure: RECTAL EXAM UNDER ANESTHESIA, ANAL FISTULA REPAIR WITH CUTTING SETON PLACEMENT;  Surgeon: Andrei Bhandari MD;  Location: [facility];  Service: General       Social History:   reports that he has never smoked. He has never used smokeless tobacco. He reports current alcohol use. He reports that he does not use drugs.      Marriage status:     Family History   Problem Relation Age of Onset   • Cancer Maternal Grandmother    • Colon cancer Maternal Grandmother    • Cancer Cousin    • Colon cancer Cousin    • No Known Problems Mother    • No Known Problems Father        No current  outpatient medications on file.    Allergy  Patient has no known allergies.    Review of Systems   Constitutional: Negative for decreased appetite and weight gain.   HENT: Negative for congestion, hearing loss and hoarse voice.    Eyes: Negative for blurred vision, discharge and visual disturbance.   Cardiovascular: Negative for chest pain, cyanosis and leg swelling.   Respiratory: Negative for cough, shortness of breath, sleep disturbances due to breathing and snoring.    Endocrine: Negative for cold intolerance and heat intolerance.   Hematologic/Lymphatic: Does not bruise/bleed easily.   Skin: Negative for itching, poor wound healing and skin cancer.   Musculoskeletal: Negative for arthritis, back pain, joint pain and joint swelling.   Gastrointestinal: Negative for abdominal pain, change in bowel habit, bowel incontinence and constipation.   Genitourinary: Negative for bladder incontinence, dysuria and hematuria.   Neurological: Negative for brief paralysis, excessive daytime sleepiness, dizziness, focal weakness, headaches, light-headedness and weakness.   Psychiatric/Behavioral: Negative for altered mental status and hallucinations. The patient does not have insomnia.    Allergic/Immunologic: Negative for HIV exposure and persistent infections.   All other systems reviewed and are negative.      Vitals:    05/24/21 0936   BP: 140/88   Pulse: 70   Temp: 97.8 °F (36.6 °C)   SpO2: 100%     Body mass index is 33.01 kg/m².    Physical Exam  Exam conducted with a chaperone present.   Constitutional:       General: He is not in acute distress.     Appearance: He is well-developed.   HENT:      Head: Normocephalic and atraumatic.      Nose: Nose normal.   Eyes:      Conjunctiva/sclera: Conjunctivae normal.      Pupils: Pupils are equal, round, and reactive to light.   Neck:      Trachea: No tracheal deviation.   Pulmonary:      Effort: Pulmonary effort is normal. No respiratory distress.      Breath sounds: Normal  breath sounds.   Abdominal:      General: Bowel sounds are normal. There is no distension.      Palpations: Abdomen is soft.   Genitourinary:     Comments: Perianal exam: left well-healed fistulotomy site.  No evidence of recurrence at this time.  No erythema.  No induration.  No drainage.    Musculoskeletal:         General: No deformity. Normal range of motion.      Cervical back: Normal range of motion.   Skin:     General: Skin is warm and dry.   Neurological:      Mental Status: He is alert and oriented to person, place, and time.      Cranial Nerves: No cranial nerve deficit.      Coordination: Coordination normal.      Gait: Gait normal.   Psychiatric:         Behavior: Behavior normal.         Thought Content: Thought content normal.         Judgment: Judgment normal.         Review of Medical Record:  I reviewed notes from Dr. Bhadnari including op note.  Also reviewed colonoscopy from 2019 that showed serrated adenoma    Assessment:  1. Anal fistula    2. Serrated adenoma of colon        Plan: Since patient is not currently symptomatic no surgery is needed at this time.  Discussed with patient that if symptoms return please call.  - Due for repeat colonoscopy because of serrated adenoma.  Patient has a gastroenterologist closer to home he preferred to do colonoscopy with.  Encouraged him to reach out because he is due this year.  - F/u PRN      Scribed for Brinda Campo MD by Vivian Frederick PA-C. 5/27/2021  10:42 EDT  This patient was evaluated by me, recommendations made, documentation reviewed, edited, and revised by me, Brinda Campo MD

## 2023-06-01 ENCOUNTER — TRANSCRIBE ORDERS (OUTPATIENT)
Dept: ADMINISTRATIVE | Facility: HOSPITAL | Age: 43
End: 2023-06-01
Payer: COMMERCIAL

## 2023-06-01 ENCOUNTER — OFFICE (AMBULATORY)
Dept: URBAN - METROPOLITAN AREA CLINIC 64 | Facility: CLINIC | Age: 43
End: 2023-06-01

## 2023-06-01 VITALS
HEIGHT: 67 IN | WEIGHT: 204 LBS | SYSTOLIC BLOOD PRESSURE: 126 MMHG | DIASTOLIC BLOOD PRESSURE: 92 MMHG | HEART RATE: 65 BPM

## 2023-06-01 DIAGNOSIS — R10.9 ABDOMINAL PAIN, UNSPECIFIED ABDOMINAL LOCATION: ICD-10-CM

## 2023-06-01 DIAGNOSIS — R12 HEARTBURN: ICD-10-CM

## 2023-06-01 DIAGNOSIS — R10.11 RIGHT UPPER QUADRANT PAIN: ICD-10-CM

## 2023-06-01 DIAGNOSIS — K62.5 RECTAL BLEEDING: Primary | ICD-10-CM

## 2023-06-01 DIAGNOSIS — K62.5 HEMORRHAGE OF ANUS AND RECTUM: ICD-10-CM

## 2023-06-01 DIAGNOSIS — R10.31 RIGHT LOWER QUADRANT PAIN: ICD-10-CM

## 2023-06-01 PROCEDURE — 99203 OFFICE O/P NEW LOW 30 MIN: CPT | Performed by: NURSE PRACTITIONER

## 2023-06-01 RX ORDER — DICYCLOMINE HYDROCHLORIDE 20 MG/1
80 TABLET ORAL
Qty: 120 | Refills: 12 | Status: COMPLETED
Start: 2023-06-01 | End: 2023-08-09

## 2023-06-01 RX ORDER — DOXYCYCLINE 100 MG/1
200 TABLET, FILM COATED ORAL
Qty: 20 | Refills: 0 | Status: COMPLETED
Start: 2023-06-01 | End: 2023-08-09

## 2023-06-17 ENCOUNTER — HOSPITAL ENCOUNTER (OUTPATIENT)
Dept: CT IMAGING | Facility: HOSPITAL | Age: 43
Discharge: HOME | End: 2023-06-17
Payer: COMMERCIAL

## 2023-06-17 DIAGNOSIS — R10.9 ABDOMINAL PAIN, UNSPECIFIED ABDOMINAL LOCATION: ICD-10-CM

## 2023-06-17 DIAGNOSIS — K62.5 RECTAL BLEEDING: ICD-10-CM

## 2023-06-17 PROCEDURE — 74177 CT ABD & PELVIS W/CONTRAST: CPT

## 2023-06-17 PROCEDURE — 25510000001 IOPAMIDOL PER 1 ML: Performed by: NURSE PRACTITIONER

## 2023-06-17 RX ADMIN — IOPAMIDOL 100 ML: 755 INJECTION, SOLUTION INTRAVENOUS at 08:47

## 2023-08-02 ENCOUNTER — HOSPITAL ENCOUNTER (OUTPATIENT)
Dept: MRI IMAGING | Facility: HOSPITAL | Age: 43
Discharge: HOME | End: 2023-08-02
Admitting: NURSE PRACTITIONER
Payer: COMMERCIAL

## 2023-08-02 DIAGNOSIS — R93.3 ABNORMAL FINDINGS ON RADIOLOGICAL EXAMINATION OF GASTROINTESTINAL TRACT: ICD-10-CM

## 2023-08-02 PROCEDURE — 25010000002 GADOTERIDOL PER 1 ML: Performed by: NURSE PRACTITIONER

## 2023-08-02 PROCEDURE — A9579 GAD-BASE MR CONTRAST NOS,1ML: HCPCS | Performed by: NURSE PRACTITIONER

## 2023-08-02 PROCEDURE — 72197 MRI PELVIS W/O & W/DYE: CPT

## 2023-08-02 RX ADMIN — GADOTERIDOL 18 ML: 279.3 INJECTION, SOLUTION INTRAVENOUS at 07:52

## 2023-08-09 ENCOUNTER — OFFICE (AMBULATORY)
Dept: URBAN - METROPOLITAN AREA CLINIC 64 | Facility: CLINIC | Age: 43
End: 2023-08-09

## 2023-08-09 VITALS
DIASTOLIC BLOOD PRESSURE: 94 MMHG | SYSTOLIC BLOOD PRESSURE: 133 MMHG | HEART RATE: 73 BPM | HEIGHT: 67 IN | WEIGHT: 201.6 LBS

## 2023-08-09 DIAGNOSIS — K80.20 CALCULUS OF GALLBLADDER WITHOUT CHOLECYSTITIS WITHOUT OBSTRU: ICD-10-CM

## 2023-08-09 DIAGNOSIS — K60.3 ANAL FISTULA: ICD-10-CM

## 2023-08-09 DIAGNOSIS — R10.11 RIGHT UPPER QUADRANT PAIN: ICD-10-CM

## 2023-08-09 PROCEDURE — 99214 OFFICE O/P EST MOD 30 MIN: CPT | Performed by: NURSE PRACTITIONER

## 2023-08-15 ENCOUNTER — OFFICE (AMBULATORY)
Dept: URBAN - METROPOLITAN AREA PATHOLOGY 4 | Facility: PATHOLOGY | Age: 43
End: 2023-08-15

## 2023-08-15 ENCOUNTER — ON CAMPUS - OUTPATIENT (AMBULATORY)
Dept: URBAN - METROPOLITAN AREA HOSPITAL 2 | Facility: HOSPITAL | Age: 43
End: 2023-08-15

## 2023-08-15 VITALS
DIASTOLIC BLOOD PRESSURE: 85 MMHG | HEART RATE: 60 BPM | HEART RATE: 77 BPM | SYSTOLIC BLOOD PRESSURE: 117 MMHG | HEART RATE: 90 BPM | HEART RATE: 80 BPM | HEIGHT: 67 IN | SYSTOLIC BLOOD PRESSURE: 113 MMHG | RESPIRATION RATE: 15 BRPM | OXYGEN SATURATION: 96 % | RESPIRATION RATE: 16 BRPM | SYSTOLIC BLOOD PRESSURE: 110 MMHG | OXYGEN SATURATION: 97 % | DIASTOLIC BLOOD PRESSURE: 92 MMHG | DIASTOLIC BLOOD PRESSURE: 78 MMHG | SYSTOLIC BLOOD PRESSURE: 141 MMHG | TEMPERATURE: 97.7 F | SYSTOLIC BLOOD PRESSURE: 116 MMHG | HEART RATE: 84 BPM | WEIGHT: 203 LBS | OXYGEN SATURATION: 99 % | DIASTOLIC BLOOD PRESSURE: 88 MMHG | DIASTOLIC BLOOD PRESSURE: 83 MMHG | SYSTOLIC BLOOD PRESSURE: 130 MMHG | HEART RATE: 83 BPM | OXYGEN SATURATION: 100 % | HEART RATE: 70 BPM | SYSTOLIC BLOOD PRESSURE: 124 MMHG | OXYGEN SATURATION: 98 % | DIASTOLIC BLOOD PRESSURE: 86 MMHG

## 2023-08-15 DIAGNOSIS — R10.9 UNSPECIFIED ABDOMINAL PAIN: ICD-10-CM

## 2023-08-15 DIAGNOSIS — K31.89 OTHER DISEASES OF STOMACH AND DUODENUM: ICD-10-CM

## 2023-08-15 DIAGNOSIS — K31.9 DISEASE OF STOMACH AND DUODENUM, UNSPECIFIED: ICD-10-CM

## 2023-08-15 LAB
GI HISTOLOGY: A. SELECT: (no result)
GI HISTOLOGY: PDF REPORT: (no result)

## 2023-08-15 PROCEDURE — 43239 EGD BIOPSY SINGLE/MULTIPLE: CPT | Performed by: INTERNAL MEDICINE

## 2023-08-15 PROCEDURE — 88305 TISSUE EXAM BY PATHOLOGIST: CPT | Performed by: INTERNAL MEDICINE

## 2024-01-11 ENCOUNTER — OFFICE (AMBULATORY)
Dept: URBAN - METROPOLITAN AREA CLINIC 64 | Facility: CLINIC | Age: 44
End: 2024-01-11

## 2024-01-11 VITALS
HEART RATE: 65 BPM | DIASTOLIC BLOOD PRESSURE: 81 MMHG | WEIGHT: 209 LBS | HEIGHT: 67 IN | SYSTOLIC BLOOD PRESSURE: 127 MMHG

## 2024-01-11 DIAGNOSIS — K60.3 ANAL FISTULA: ICD-10-CM

## 2024-01-11 DIAGNOSIS — R07.9 CHEST PAIN, UNSPECIFIED: ICD-10-CM

## 2024-01-11 PROCEDURE — 99213 OFFICE O/P EST LOW 20 MIN: CPT | Performed by: NURSE PRACTITIONER

## 2024-01-12 ENCOUNTER — HOSPITAL ENCOUNTER (EMERGENCY)
Facility: HOSPITAL | Age: 44
Discharge: HOME | End: 2024-01-12
Attending: EMERGENCY MEDICINE
Payer: COMMERCIAL

## 2024-01-12 ENCOUNTER — APPOINTMENT (OUTPATIENT)
Dept: CT IMAGING | Facility: HOSPITAL | Age: 44
End: 2024-01-12
Payer: COMMERCIAL

## 2024-01-12 VITALS
OXYGEN SATURATION: 98 % | BODY MASS INDEX: 32.6 KG/M2 | RESPIRATION RATE: 16 BRPM | DIASTOLIC BLOOD PRESSURE: 83 MMHG | WEIGHT: 207.67 LBS | HEART RATE: 66 BPM | TEMPERATURE: 97.7 F | HEIGHT: 67 IN | SYSTOLIC BLOOD PRESSURE: 121 MMHG

## 2024-01-12 DIAGNOSIS — R07.9 CHEST PAIN, UNSPECIFIED TYPE: Primary | ICD-10-CM

## 2024-01-12 LAB
ALBUMIN SERPL-MCNC: 4.3 G/DL (ref 3.5–5.2)
ALBUMIN/GLOB SERPL: 2.2 G/DL
ALP SERPL-CCNC: 65 U/L (ref 39–117)
ALT SERPL W P-5'-P-CCNC: 39 U/L (ref 1–41)
ANION GAP SERPL CALCULATED.3IONS-SCNC: 9 MMOL/L (ref 5–15)
APTT PPP: 27.6 SECONDS (ref 61–76.5)
AST SERPL-CCNC: 19 U/L (ref 1–40)
BASOPHILS # BLD AUTO: 0 10*3/MM3 (ref 0–0.2)
BASOPHILS NFR BLD AUTO: 1.2 % (ref 0–1.5)
BILIRUB SERPL-MCNC: 1.1 MG/DL (ref 0–1.2)
BUN SERPL-MCNC: 15 MG/DL (ref 6–20)
BUN/CREAT SERPL: 13.2 (ref 7–25)
CALCIUM SPEC-SCNC: 8.9 MG/DL (ref 8.6–10.5)
CHLORIDE SERPL-SCNC: 104 MMOL/L (ref 98–107)
CO2 SERPL-SCNC: 26 MMOL/L (ref 22–29)
CREAT SERPL-MCNC: 1.14 MG/DL (ref 0.76–1.27)
D DIMER PPP FEU-MCNC: <0.19 MG/L (FEU) (ref 0–0.5)
DEPRECATED RDW RBC AUTO: 40.7 FL (ref 37–54)
EGFRCR SERPLBLD CKD-EPI 2021: 82.3 ML/MIN/1.73
EOSINOPHIL # BLD AUTO: 0.1 10*3/MM3 (ref 0–0.4)
EOSINOPHIL NFR BLD AUTO: 2 % (ref 0.3–6.2)
ERYTHROCYTE [DISTWIDTH] IN BLOOD BY AUTOMATED COUNT: 12.7 % (ref 12.3–15.4)
GEN 5 2HR TROPONIN T REFLEX: <6 NG/L
GLOBULIN UR ELPH-MCNC: 2 GM/DL
GLUCOSE SERPL-MCNC: 117 MG/DL (ref 65–99)
HCT VFR BLD AUTO: 46.4 % (ref 37.5–51)
HGB BLD-MCNC: 15.7 G/DL (ref 13–17.7)
INR PPP: 0.98 (ref 0.93–1.1)
LYMPHOCYTES # BLD AUTO: 1.1 10*3/MM3 (ref 0.7–3.1)
LYMPHOCYTES NFR BLD AUTO: 29.5 % (ref 19.6–45.3)
MCH RBC QN AUTO: 29.5 PG (ref 26.6–33)
MCHC RBC AUTO-ENTMCNC: 33.8 G/DL (ref 31.5–35.7)
MCV RBC AUTO: 87.2 FL (ref 79–97)
MONOCYTES # BLD AUTO: 0.3 10*3/MM3 (ref 0.1–0.9)
MONOCYTES NFR BLD AUTO: 7.9 % (ref 5–12)
NEUTROPHILS NFR BLD AUTO: 2.3 10*3/MM3 (ref 1.7–7)
NEUTROPHILS NFR BLD AUTO: 59.4 % (ref 42.7–76)
NRBC BLD AUTO-RTO: 0.3 /100 WBC (ref 0–0.2)
PLATELET # BLD AUTO: 172 10*3/MM3 (ref 140–450)
PMV BLD AUTO: 9.6 FL (ref 6–12)
POTASSIUM SERPL-SCNC: 3.9 MMOL/L (ref 3.5–5.2)
PROT SERPL-MCNC: 6.3 G/DL (ref 6–8.5)
PROTHROMBIN TIME: 10.7 SECONDS (ref 9.6–11.7)
RBC # BLD AUTO: 5.32 10*6/MM3 (ref 4.14–5.8)
SODIUM SERPL-SCNC: 139 MMOL/L (ref 136–145)
TROPONIN T DELTA: NORMAL
TROPONIN T SERPL HS-MCNC: 6 NG/L
WBC NRBC COR # BLD AUTO: 3.8 10*3/MM3 (ref 3.4–10.8)

## 2024-01-12 PROCEDURE — 85025 COMPLETE CBC W/AUTO DIFF WBC: CPT | Performed by: EMERGENCY MEDICINE

## 2024-01-12 PROCEDURE — 85730 THROMBOPLASTIN TIME PARTIAL: CPT | Performed by: EMERGENCY MEDICINE

## 2024-01-12 PROCEDURE — 93005 ELECTROCARDIOGRAM TRACING: CPT

## 2024-01-12 PROCEDURE — 71275 CT ANGIOGRAPHY CHEST: CPT

## 2024-01-12 PROCEDURE — 99285 EMERGENCY DEPT VISIT HI MDM: CPT

## 2024-01-12 PROCEDURE — 85610 PROTHROMBIN TIME: CPT | Performed by: EMERGENCY MEDICINE

## 2024-01-12 PROCEDURE — 36415 COLL VENOUS BLD VENIPUNCTURE: CPT

## 2024-01-12 PROCEDURE — 93005 ELECTROCARDIOGRAM TRACING: CPT | Performed by: EMERGENCY MEDICINE

## 2024-01-12 PROCEDURE — 25510000001 IOPAMIDOL PER 1 ML: Performed by: EMERGENCY MEDICINE

## 2024-01-12 PROCEDURE — 84484 ASSAY OF TROPONIN QUANT: CPT | Performed by: EMERGENCY MEDICINE

## 2024-01-12 PROCEDURE — 85379 FIBRIN DEGRADATION QUANT: CPT | Performed by: EMERGENCY MEDICINE

## 2024-01-12 PROCEDURE — 80053 COMPREHEN METABOLIC PANEL: CPT | Performed by: EMERGENCY MEDICINE

## 2024-01-12 RX ADMIN — IOPAMIDOL 100 ML: 755 INJECTION, SOLUTION INTRAVENOUS at 07:35

## 2024-01-12 NOTE — Clinical Note
[facility] EMERGENCY DEPARTMENT  [address] [place] [address]  Phone: [phone]    Cj Jasso was seen and treated in our emergency department on 1/12/2024.  He may return to work on 01/13/2024.         Thank you for choosing [facility].    Jose Carlos Ruiz PA

## 2024-01-12 NOTE — ED PROVIDER NOTES
Subjective   History of Present Illness  43-year-old male with moderate sharp left-sided chest pain radiating to back yesterday morning presents for further evaluation.  Patient states the chest pain resolved yesterday evening but the back pain has persisted.  Patient was at his GI doctor yesterday who ordered an outpatient chest x-ray which is negative for my review.        Review of Systems   Cardiovascular:  Positive for chest pain.   Musculoskeletal:  Positive for back pain.       Past Medical History:   Diagnosis Date    Colon polyps     Elevated blood pressure reading without diagnosis of hypertension 1/19/2019    Gastrocnemius tear, right, initial encounter 10/11/2020    SEEN AT [facility] ER    Kidney stones     Muscle strain of left lower leg 08/31/2019    SEEN AT 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 THALIA JOHNSON AT [facility]    INCISION AND DRAINAGE PERIRECTAL ABSCESS N/A 01/30/2019    DONE AT [facility]    RECTAL EXAMINATION UNDER ANESTHESIA N/A 6/19/2019    Procedure: RECTAL EXAM UNDER ANESTHESIA, ANAL FISTULA REPAIR WITH CUTTING SETON PLACEMENT;  Surgeon: Andrei Bhandari MD;  Location: [facility];  Service: General       Family History   Problem Relation Age of Onset    Cancer Maternal Grandmother     Colon cancer Maternal Grandmother     Cancer Cousin     Colon cancer Cousin     No Known Problems Mother     No Known Problems Father        Social History     Socioeconomic History    Marital status:    Tobacco Use    Smoking status: Never    Smokeless tobacco: Never   Vaping Use    Vaping Use: Never used   Substance and Sexual Activity    Alcohol use: Yes      "Comment: RARELY    Drug use: No    Sexual activity: Defer           Objective   Physical Exam  Constitutional:       Appearance: Normal appearance.   HENT:      Head: Normocephalic and atraumatic.      Mouth/Throat:      Mouth: Mucous membranes are moist.      Pharynx: Oropharynx is clear.   Cardiovascular:      Rate and Rhythm: Normal rate and regular rhythm.      Heart sounds: Normal heart sounds.   Pulmonary:      Effort: Pulmonary effort is normal.      Breath sounds: Normal breath sounds.   Abdominal:      General: Bowel sounds are normal. There is no distension.      Palpations: Abdomen is soft.      Tenderness: There is no abdominal tenderness.   Musculoskeletal:         General: No swelling or tenderness.   Skin:     General: Skin is warm and dry.      Capillary Refill: Capillary refill takes less than 2 seconds.   Neurological:      General: No focal deficit present.      Mental Status: He is alert and oriented to person, place, and time.   Psychiatric:         Mood and Affect: Mood normal.         Behavior: Behavior normal.         Procedures           ED Course  ED Course as of 01/12/24 0835   Fri Jan 12, 2024   0724 Patient care transferred to me from Dr. Pearce pending repeat troponin and CT chest and disposition. [AA]   0754 Ct negative  [AA]      ED Course User Index  [AA] Jose Carlos Ruiz PA    /83 (BP Location: Left arm, Patient Position: Lying)   Pulse 66   Temp 97.7 °F (36.5 °C) (Oral)   Resp 16   Ht 170.2 cm (67\")   Wt 94.2 kg (207 lb 10.8 oz)   SpO2 98%   BMI 32.53 kg/m²   Medications   iopamidol (ISOVUE-370) 76 % injection 100 mL (100 mL Intravenous Given 1/12/24 0735)     Labs Reviewed   COMPREHENSIVE METABOLIC PANEL - Abnormal; Notable for the following components:       Result Value    Glucose 117 (*)     All other components within normal limits    Narrative:     GFR Normal >60  Chronic Kidney Disease <60  Kidney Failure <15     APTT - Abnormal; Notable for the following " "components:    PTT 27.6 (*)     All other components within normal limits   CBC WITH AUTO DIFFERENTIAL - Abnormal; Notable for the following components:    nRBC 0.3 (*)     All other components within normal limits   PROTIME-INR - Normal   D-DIMER, QUANTITATIVE - Normal    Narrative:     According to the assay 's published package insert, a normal (<0.50 mg/L (FEU)) D-dimer result in conjunction with a non-high clinical probability assessment, excludes deep vein thrombosis (DVT) and pulmonary embolism (PE) with high sensitivity.    D-dimer values increase with age and this can make VTE exclusion of an older population difficult. To address this, the [employer] College [employer], based on best available evidence and recent guidelines, recommends that clinicians use age-adjusted D-dimer thresholds in patients greater than 50 years of age with: a) a low probability of PE who do not meet all Pulmonary Embolism Rule Out Criteria, or b) in those with intermediate probability of PE.   The formula for an age-adjusted D-dimer cut-off is \"age/100\".  For example, a 60 year old patient would have an age-adjusted cut-off of 0.60 mg/L (FEU) and an 80 year old 0.80 mg/L (FEU).   TROPONIN - Normal    Narrative:     High Sensitive Troponin T Reference Range:  <14.0 ng/L- Negative Female for AMI  <22.0 ng/L- Negative Male for AMI  >=14 - Abnormal Female indicating possible myocardial injury.  >=22 - Abnormal Male indicating possible myocardial injury.   Clinicians would have to utilize clinical acumen, EKG, Troponin, and serial changes to determine if it is an Acute Myocardial Infarction or myocardial injury due to an underlying chronic condition.        HIGH SENSITIVITIY TROPONIN T 2HR    Narrative:     High Sensitive Troponin T Reference Range:  <14.0 ng/L- Negative Female for AMI  <22.0 ng/L- Negative Male for AMI  >=14 - Abnormal Female indicating possible myocardial injury.  >=22 - Abnormal Male indicating possible " myocardial injury.   Clinicians would have to utilize clinical acumen, EKG, Troponin, and serial changes to determine if it is an Acute Myocardial Infarction or myocardial injury due to an underlying chronic condition.        CBC AND DIFFERENTIAL    Narrative:     The following orders were created for panel order CBC & Differential.  Procedure                               Abnormality         Status                     ---------                               -----------         ------                     CBC Auto Differential[[id number]]        Abnormal            Final result                 Please view results for these tests on the individual orders.     CT Angiogram Chest   Final Result   Impression:      1. No thoracic aortic aneurysm or dissection.   2. No acute pulmonary embolic disease.   3. Incidental mild dependent atelectasis in the lower lobes. The lungs and pleural spaces are otherwise clear.            Electronically Signed: Edwin Gusman MD     1/12/2024 7:43 AM EST     Workstation ID: [id number]                    HEART Score: 1                              Medical Decision Making  Appropriate PPE was worn during exam and throughout all encounters with the patient.  Patient care transferred to me from Dr. Pearce pending CT results and repeat troponin along with disposition.  CT chest was ordered to rule out aneurysm dissection and even PE. Patient presented with complaints of chest pain that was going through to his back yesterday.  He states that the pain is just more in his back.  No significant cardiac history and heart score is low.  EKG showed sinus bradycardia otherwise unremarkable.  Labs showed no signs of severe infection or electrolyte abnormality.  Serial troponins normal.  D-dimer was normal.  CT angiogram showed no aneurysm or dissection also no signs of PE.  There was some atelectasis as described above.  Upon reassessment patient is resting quietly is not tachycardic or hypoxic he is not  ill-appearing.  He voiced understanding of discharge along with signs and symptoms to return.  He was in agreement plan all questions were answered.    This document is intended for medical expert use only. Reading of this document by patients and/or patient's family without participating medical staff guidance may result in misinterpretation and unintended morbidity.  Any interpretation of such data is the responsibility of the patient and/or family member responsible for the patient in concert with their primary or specialist providers, not to be left for sources of online searches such as [employer], [employer] or similar queries. Relying on these approaches to knowledge may result in misinterpretation, misguided goals of care and even death should patients or family members try recommendations outside of the realm of professional medical care in a supervised inpatient environment.       Problems Addressed:  Chest pain, unspecified type: acute illness or injury    Amount and/or Complexity of Data Reviewed  Labs: ordered. Decision-making details documented in ED Course.  Radiology: ordered. Decision-making details documented in ED Course.  ECG/medicine tests: ordered and independent interpretation performed.     Details: EKG interpretation: Sinus bradycardia, rate 58, no STEMI seen    Risk  Prescription drug management.        Final diagnoses:   Chest pain, unspecified type       ED Disposition  ED Disposition       ED Disposition   Discharge    Condition   Stable    Comment   --               Gabby Ames MD  [address] [place] [address] [phone]    Schedule an appointment as soon as possible for a visit in 3 days      [facility] EMERGENCY DEPARTMENT  [address] [address]  [phone]  Go to   If symptoms worsen    Colby Gaffney MD  [address] [place] [address] [phone]    Call   As needed         Medication List      No changes were made to your  prescriptions during this visit.            Jose Carlos Ruiz PA  01/12/24 0849

## 2024-01-12 NOTE — DISCHARGE INSTRUCTIONS
Follow-up with your primary care provider in 3-5 days.  If you do not have a primary care provider call [phone] for help in finding one, or you may follow up with [facility] at [phone].    Follow-up with cardiologist as desired if your chest pain returns    Return to the ED for any new or worsening symptoms    Over-the-counter Tylenol ibuprofen as needed for pain

## 2024-01-13 LAB
QT INTERVAL: 395 MS
QTC INTERVAL: 388 MS

## 2024-07-15 ENCOUNTER — OFFICE (AMBULATORY)
Dept: URBAN - METROPOLITAN AREA CLINIC 64 | Facility: CLINIC | Age: 44
End: 2024-07-15

## 2024-07-15 VITALS
HEART RATE: 59 BPM | HEIGHT: 67 IN | DIASTOLIC BLOOD PRESSURE: 77 MMHG | SYSTOLIC BLOOD PRESSURE: 121 MMHG | WEIGHT: 207 LBS

## 2024-07-15 DIAGNOSIS — Z86.010 PERSONAL HISTORY OF COLONIC POLYPS: ICD-10-CM

## 2024-07-15 DIAGNOSIS — R10.30 LOWER ABDOMINAL PAIN, UNSPECIFIED: ICD-10-CM

## 2024-07-15 PROCEDURE — 99213 OFFICE O/P EST LOW 20 MIN: CPT | Performed by: NURSE PRACTITIONER

## 2025-07-24 ENCOUNTER — OFFICE (AMBULATORY)
Dept: URBAN - METROPOLITAN AREA CLINIC 64 | Facility: CLINIC | Age: 45
End: 2025-07-24

## 2025-07-24 DIAGNOSIS — R10.30 LOWER ABDOMINAL PAIN, UNSPECIFIED: ICD-10-CM

## 2025-08-18 ENCOUNTER — OFFICE (AMBULATORY)
Dept: URBAN - METROPOLITAN AREA CLINIC 64 | Facility: CLINIC | Age: 45
End: 2025-08-18

## 2025-08-18 VITALS
WEIGHT: 206 LBS | DIASTOLIC BLOOD PRESSURE: 88 MMHG | HEART RATE: 73 BPM | SYSTOLIC BLOOD PRESSURE: 138 MMHG | HEIGHT: 67 IN

## 2025-08-18 DIAGNOSIS — Z86.0101 PERSONAL HISTORY OF ADENOMATOUS AND SERRATED COLON POLYPS: ICD-10-CM

## 2025-08-18 DIAGNOSIS — R10.13 EPIGASTRIC PAIN: ICD-10-CM

## 2025-08-18 PROCEDURE — 99214 OFFICE O/P EST MOD 30 MIN: CPT | Performed by: NURSE PRACTITIONER

## 2025-08-18 RX ORDER — FAMOTIDINE 40 MG/1
40 TABLET, FILM COATED ORAL
Qty: 90 | Refills: 3 | Status: ACTIVE
Start: 2025-08-18

## (undated) DEVICE — UNDERGLV SURG BIOGEL INDICATOR LTX PF 7

## (undated) DEVICE — SUT SILK 2/0 FS BLK 18IN 685G

## (undated) DEVICE — VESSEL LOOPS,MAXI, RED: Brand: DEVON

## (undated) DEVICE — SUT VIC 3/0 SH 27IN J416H

## (undated) DEVICE — SOL IRRIG SOD CHL 0.9PCT 3000ML

## (undated) DEVICE — PK PROC TURNOVER

## (undated) DEVICE — PK MINOR LITHOTOMY 50

## (undated) DEVICE — PANTY KNIT MATERN L/XL

## (undated) DEVICE — SOL IRRIG H2O 1000ML STRL

## (undated) DEVICE — GLV SURG BIOGEL LTX PF 7

## (undated) DEVICE — ELECTRD NDL EZ CLN MOD 2.75IN